# Patient Record
Sex: FEMALE | Race: WHITE | Employment: FULL TIME | URBAN - METROPOLITAN AREA
[De-identification: names, ages, dates, MRNs, and addresses within clinical notes are randomized per-mention and may not be internally consistent; named-entity substitution may affect disease eponyms.]

---

## 2018-08-21 ENCOUNTER — APPOINTMENT (OUTPATIENT)
Dept: MRI IMAGING | Facility: HOSPITAL | Age: 28
DRG: 060 | End: 2018-08-21
Attending: EMERGENCY MEDICINE

## 2018-08-21 ENCOUNTER — HOSPITAL ENCOUNTER (INPATIENT)
Facility: HOSPITAL | Age: 28
LOS: 4 days | Discharge: HOME | DRG: 060 | End: 2018-08-25
Attending: EMERGENCY MEDICINE | Admitting: HOSPITALIST

## 2018-08-21 DIAGNOSIS — R42 VERTIGO: Primary | ICD-10-CM

## 2018-08-21 DIAGNOSIS — G35 MULTIPLE SCLEROSIS (HCC): ICD-10-CM

## 2018-08-21 LAB
ALBUMIN SERPL-MCNC: 4.1 G/DL (ref 3.5–4.8)
ALBUMIN/GLOB SERPL: 1.3 {RATIO} (ref 1–2)
ALP LIVER SERPL-CCNC: 68 U/L (ref 37–98)
ALT SERPL-CCNC: 22 U/L (ref 14–54)
ANION GAP SERPL CALC-SCNC: 7 MMOL/L (ref 0–18)
AST SERPL-CCNC: 17 U/L (ref 15–41)
BASOPHILS # BLD AUTO: 0.02 X10(3) UL (ref 0–0.1)
BASOPHILS NFR BLD AUTO: 0.3 %
BILIRUB SERPL-MCNC: 0.5 MG/DL (ref 0.1–2)
BILIRUB UR QL STRIP.AUTO: NEGATIVE
BUN BLD-MCNC: 10 MG/DL (ref 8–20)
BUN/CREAT SERPL: 12.3 (ref 10–20)
CALCIUM BLD-MCNC: 9.1 MG/DL (ref 8.3–10.3)
CHLORIDE SERPL-SCNC: 107 MMOL/L (ref 101–111)
CLARITY UR REFRACT.AUTO: CLEAR
CO2 SERPL-SCNC: 27 MMOL/L (ref 22–32)
CREAT BLD-MCNC: 0.81 MG/DL (ref 0.55–1.02)
EOSINOPHIL # BLD AUTO: 0.17 X10(3) UL (ref 0–0.3)
EOSINOPHIL NFR BLD AUTO: 2.2 %
ERYTHROCYTE [DISTWIDTH] IN BLOOD BY AUTOMATED COUNT: 12.4 % (ref 11.5–16)
GLOBULIN PLAS-MCNC: 3.1 G/DL (ref 2.5–4)
GLUCOSE BLD-MCNC: 90 MG/DL (ref 70–99)
GLUCOSE UR STRIP.AUTO-MCNC: NEGATIVE MG/DL
HCT VFR BLD AUTO: 42 % (ref 34–50)
HGB BLD-MCNC: 14.1 G/DL (ref 12–16)
IMMATURE GRANULOCYTE COUNT: 0.05 X10(3) UL (ref 0–1)
IMMATURE GRANULOCYTE RATIO %: 0.6 %
KETONES UR STRIP.AUTO-MCNC: NEGATIVE MG/DL
LEUKOCYTE ESTERASE UR QL STRIP.AUTO: NEGATIVE
LYMPHOCYTES # BLD AUTO: 2.18 X10(3) UL (ref 0.9–4)
LYMPHOCYTES NFR BLD AUTO: 27.9 %
M PROTEIN MFR SERPL ELPH: 7.2 G/DL (ref 6.1–8.3)
MCH RBC QN AUTO: 29.9 PG (ref 27–33.2)
MCHC RBC AUTO-ENTMCNC: 33.6 G/DL (ref 31–37)
MCV RBC AUTO: 89 FL (ref 81–100)
MONOCYTES # BLD AUTO: 0.52 X10(3) UL (ref 0.1–1)
MONOCYTES NFR BLD AUTO: 6.7 %
NEUTROPHIL ABS PRELIM: 4.86 X10 (3) UL (ref 1.3–6.7)
NEUTROPHILS # BLD AUTO: 4.86 X10(3) UL (ref 1.3–6.7)
NEUTROPHILS NFR BLD AUTO: 62.3 %
NITRITE UR QL STRIP.AUTO: NEGATIVE
OSMOLALITY SERPL CALC.SUM OF ELEC: 291 MOSM/KG (ref 275–295)
PH UR STRIP.AUTO: 7 [PH] (ref 4.5–8)
PLATELET # BLD AUTO: 266 10(3)UL (ref 150–450)
POCT URINE PREGNANCY: NEGATIVE
POTASSIUM SERPL-SCNC: 4.2 MMOL/L (ref 3.6–5.1)
PROT UR STRIP.AUTO-MCNC: NEGATIVE MG/DL
RBC # BLD AUTO: 4.72 X10(6)UL (ref 3.8–5.1)
RED CELL DISTRIBUTION WIDTH-SD: 40.4 FL (ref 35.1–46.3)
SODIUM SERPL-SCNC: 141 MMOL/L (ref 136–144)
SP GR UR STRIP.AUTO: <1.005 (ref 1–1.03)
UROBILINOGEN UR STRIP.AUTO-MCNC: <2 MG/DL
WBC # BLD AUTO: 7.8 X10(3) UL (ref 4–13)

## 2018-08-21 PROCEDURE — 70553 MRI BRAIN STEM W/O & W/DYE: CPT | Performed by: EMERGENCY MEDICINE

## 2018-08-21 PROCEDURE — 99223 1ST HOSP IP/OBS HIGH 75: CPT | Performed by: HOSPITALIST

## 2018-08-21 RX ORDER — MECLIZINE HYDROCHLORIDE 25 MG/1
25 TABLET ORAL ONCE
Status: COMPLETED | OUTPATIENT
Start: 2018-08-21 | End: 2018-08-21

## 2018-08-21 RX ORDER — SODIUM CHLORIDE 9 MG/ML
INJECTION, SOLUTION INTRAVENOUS CONTINUOUS
Status: DISCONTINUED | OUTPATIENT
Start: 2018-08-21 | End: 2018-08-25

## 2018-08-21 RX ORDER — METOCLOPRAMIDE HYDROCHLORIDE 5 MG/ML
10 INJECTION INTRAMUSCULAR; INTRAVENOUS EVERY 8 HOURS PRN
Status: DISCONTINUED | OUTPATIENT
Start: 2018-08-21 | End: 2018-08-25

## 2018-08-21 RX ORDER — DOCUSATE SODIUM 100 MG/1
100 CAPSULE, LIQUID FILLED ORAL 2 TIMES DAILY
Status: DISCONTINUED | OUTPATIENT
Start: 2018-08-21 | End: 2018-08-25

## 2018-08-21 RX ORDER — ONDANSETRON 2 MG/ML
4 INJECTION INTRAMUSCULAR; INTRAVENOUS EVERY 6 HOURS PRN
Status: DISCONTINUED | OUTPATIENT
Start: 2018-08-21 | End: 2018-08-25

## 2018-08-21 RX ORDER — ENOXAPARIN SODIUM 100 MG/ML
40 INJECTION SUBCUTANEOUS NIGHTLY
Status: DISCONTINUED | OUTPATIENT
Start: 2018-08-21 | End: 2018-08-23

## 2018-08-21 RX ORDER — ACETAMINOPHEN 325 MG/1
650 TABLET ORAL EVERY 6 HOURS PRN
Status: DISCONTINUED | OUTPATIENT
Start: 2018-08-21 | End: 2018-08-25

## 2018-08-21 NOTE — ED INITIAL ASSESSMENT (HPI)
Dizzy since yesterday with nausea,  Gait unsteady, unable to drive straight, unable to type.   Denies paraesthesia now,
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
## 2018-08-21 NOTE — ED PROVIDER NOTES
Patient Seen in: [facility] Emergency Department    History   Patient presents with:  Dizziness (neurologic)    Stated Complaint: feeling lightheaded since yesterday    HPI    Patient is a 54-year-old female who states for the past 2 years she has h clear to auscultation bilaterally. CARDIOVASCULAR: + S1-S2, regular rate and rhythm, no murmurs. BACK: No CVA tenderness, no midline bony tenderness. ABDOMEN: + Bowel sounds, soft, nontender, nondistended.   No rebound, no guarding, no hepatosplenomegaly PROTEIN STUDY   ANCA PANEL VASCULITIS W/REFLEX   AQUAPORIN-4 RECEPTOR ANTIBODY   PATH COMMENT CSF   POCT PREGNANCY, URINE   CYTOLOGY FLUIDS   RAINBOW DRAW BLUE   RAINBOW DRAW LAVENDER   RAINBOW DRAW LIGHT GREEN   RAINBOW DRAW GOLD   CSF CULTURE   FUNGUS CU

## 2018-08-21 NOTE — ED NOTES
No change in patient's assessment. Reports she continues to feel off. Speech is clear, denies any weakness.   Patient aware and agrezes with plan for MRI, apologized for delay, patient understanding

## 2018-08-21 NOTE — ED PROVIDER NOTES
Mri Brain (w+wo) (cpt=70553)    Result Date: 8/21/2018  PROCEDURE:  MRI BRAIN (W+WO) (CPT=70553)  COMPARISON:  None.   INDICATIONS:  feeling lightheaded since yesterday  TECHNIQUE:  MRI of the brain was performed with multi-planar T1, T2-weighted images wit Dr. Eddy Agent at the dictation time shown below.      Dictated by: Almetta Dakin, MD on 8/21/2018 at 17:53     Approved by: Almetta Dakin, MD              MRI shows multiple areas of abnormal signal within the white matter concerning for a demyelinating process

## 2018-08-22 ENCOUNTER — APPOINTMENT (OUTPATIENT)
Dept: MRI IMAGING | Facility: HOSPITAL | Age: 28
DRG: 060 | End: 2018-08-22
Attending: NURSE PRACTITIONER

## 2018-08-22 PROBLEM — G37.9 DEMYELINATING DISEASE OF CENTRAL NERVOUS SYSTEM (HCC): Status: ACTIVE | Noted: 2018-08-21

## 2018-08-22 LAB
CRP SERPL-MCNC: <0.29 MG/DL (ref ?–1)
HAV AB SERPL IA-ACNC: 427 PG/ML (ref 193–986)
RHEUMATOID FACT SERPL-ACNC: <10 IU/ML (ref ?–15)
SED RATE-ML: 7 MM/HR (ref 0–25)

## 2018-08-22 PROCEDURE — 72156 MRI NECK SPINE W/O & W/DYE: CPT | Performed by: NURSE PRACTITIONER

## 2018-08-22 PROCEDURE — 99223 1ST HOSP IP/OBS HIGH 75: CPT | Performed by: OTHER

## 2018-08-22 PROCEDURE — 72157 MRI CHEST SPINE W/O & W/DYE: CPT | Performed by: NURSE PRACTITIONER

## 2018-08-22 PROCEDURE — 99232 SBSQ HOSP IP/OBS MODERATE 35: CPT | Performed by: HOSPITALIST

## 2018-08-22 NOTE — PHYSICAL THERAPY NOTE
PHYSICAL THERAPY EVALUATION - INPATIENT     Room Number: 425/425-A  Evaluation Date: 8/22/2018  Type of Evaluation: Initial  Physician Order: PT Eval and Treat    Presenting Problem: vertigo  Reason for Therapy: Mobility Dysfunction and Discharge Tim 5/5    BALANCE  Static Sitting: Good  Dynamic Sitting: Good  Static Standing: Fair -  Dynamic Standing: Poor +    ADDITIONAL TESTS                                    NEUROLOGICAL FINDINGS                      ACTIVITY TOLERANCE  Room air  No shortness of b right shoulders while standing   4  6.  Standing unsupported one foot in front                                   0  7. Standing on one leg                                                                0    Pt scored 16/28 which shows at high risk for falls Good  Frequency (Obs): 3x/week  Number of Visits to Meet Established Goals: 3      CURRENT GOALS    Goal #1      Goal #2 Patient is able to demonstrate transfers Sit to/from Stand at assistance level: independent     Goal #3 Patient is able to ambulate 300

## 2018-08-22 NOTE — CONSULTS
[id number] [facility] Initial Consultation    Angela Faulkner Patient Status:  Inpatient    1990 MRN [id number]   [facility] 4NW-A Attending Brooke Saeed MD   Hosp Day # 1 PCP Bessy Damon MD     REASON FOR CONSULTATION: 10-point system was reviewed. Pertinent positives and negatives are noted in HPI. PAST MEDICAL HISTORY:  History reviewed. No pertinent past medical history. PAST SURGICAL HISTORY:  History reviewed. No pertinent surgical history.     FAMILY HISTOR palate midline, SCM intact; otherwise, 2-12 intact  Motor: 5/5 strength throughout, tone normal  DTR: 2+ symmetric throughout, toes downgoing bilaterally, no clonus  Sensory: intact to light touch, pinprick, vibration and proprioception  Coord: FNF and HKS

## 2018-08-22 NOTE — PROGRESS NOTES
[facility]OSPITAL[facility]  Progress Note     Lizzette Gamble Patient Status:  Inpatient    1990 MRN [id number]   [facility] 4NW-A Attending Kris Byrne MD   Hosp Day # 1 PCP Piper West MD     Chief Complaint: dizziness ataxia    S: Patie imaging, eval per neurology today, with Lp and MRI c and T spine ordered  4.  PT/OT to see  Mary Dubose MD

## 2018-08-22 NOTE — PROGRESS NOTES
[id number] [facility] Preliminary Note    Theresa Proctor Patient Status:  Inpatient    1990 MRN [id number]   [facility] 4NW-A Attending Yoli Harkins MD   Hosp Day # 1 PCP Isak Samuel MD     REASON FOR CONSULTATION:    Reno Mazariegos that she has been smoking.   She has never used smokeless tobacco.    ALLERGIES:  No Known Allergies    MEDICATIONS:  Prior to Admission medications :  Not on File      Current Facility-Administered Medications:  0.9%  NaCl infusion  Intravenous Continuous 1. Multiple areas of abnormal signal within the white matter, some of which demonstrate enhancement and restricted diffusion as detailed above.    Findings most likely represent a demyelinating process such as multiple sclerosis particularly given ED MD

## 2018-08-22 NOTE — PLAN OF CARE
NURSING ADMISSION NOTE      Patient admitted via Cart  Oriented to room. Safety precautions initiated. Bed in low position. Call light in reach. Pt A/O x 4. Rm air. Denies pain. Denies N/V/D, reports good appetite.  Last BM a few days ago, reports

## 2018-08-22 NOTE — PAYOR COMM NOTE
--------------  ADMISSION REVIEW     Payor: N/A  Subscriber #:  No Subscriber Number on File  Authorization Number: N/A    Admit date: 8/21/18  Admit time: 2056       Admitting Physician: Shelly Clements MD  Attending Physician:  Sully Lozada MD  Primary Ca 60   Temp 98.2 °F (36.8 °C) (Oral)   Resp 16   Ht 5' 9\" (1.753 m)   Wt 181 lb 4.8 oz (82.2 kg)   LMP 08/20/2018   SpO2 99%   BMI 26.77 kg/m²          Diagnostic Data:      Labs:  Recent Labs   Lab  08/21/18   1157   WBC  7.8   HGB  14.1   MCV  89.0   PLT

## 2018-08-22 NOTE — OCCUPATIONAL THERAPY NOTE
OCCUPATIONAL THERAPY EVALUATION - INPATIENT     Room Number: 425/425-A  Evaluation Date: 8/22/2018  Type of Evaluation: Initial  Presenting Problem: vertigo     Physician Order: IP Consult to Occupational Therapy  Reason for Therapy: ADL/IADL Dysfunction a line.\"     Patient self-stated goal is none identified      OBJECTIVE  Precautions:  Other (Comment) (impulsive )  Fall Risk: High fall risk    WEIGHT BEARING RESTRICTION  Weight Bearing Restriction: None                PAIN ASSESSMENT  Ratin  Location 44.27  CMS Modifier (G-Code): CJ    FUNCTIONAL TRANSFER ASSESSMENT  Supine to Sit : Supervision  Sit to Stand: Supervision    Skilled Therapy Provided: Pt was found in bed in a semi-supine position. Pt performed supine>sit EOB with supervision assistance. performance deficits impacting engagement in ADL/IADL MODERATE  3 - 5 performance deficits   Client Assessment/Performance Deficits MODERATE - Comorbidities and min to mod modifications of tasks    Clinical Decision Making MODERATE - Analysis of occupation

## 2018-08-22 NOTE — H&P
[facility]  History and Physical     Jerel Gamboa Patient Status:  Inpatient    1990 MRN [id number]   [facility] 4NW-A Attending Sukhjinder Garcia MD   Hosp Day # 0 PCP Olivia Mercer MD     Chief Complaint: Dizziness, ataxia lb 4.8 oz (82.2 kg)   LMP 08/20/2018   SpO2 99%   BMI 26.77 kg/m²   General: No acute distress. Alert and oriented x 3. HEENT: Normocephalic atraumatic. Moist mucous membranes. EOM-I. PERRLA. Anicteric. Neck: No lymphadenopathy. No JVD.  No carotid bruits

## 2018-08-23 ENCOUNTER — APPOINTMENT (OUTPATIENT)
Dept: GENERAL RADIOLOGY | Facility: HOSPITAL | Age: 28
DRG: 060 | End: 2018-08-23
Attending: NURSE PRACTITIONER

## 2018-08-23 LAB
BASOPHIL CSF: 0 %
BASOPHIL CSF: 0 %
COLOR CSF: COLORLESS
COLOR CSF: COLORLESS
COUNT PERFORMED ON TUBE: 1
COUNT PERFORMED ON TUBE: 4
EOSINOPHILS CSF: 0 %
EOSINOPHILS CSF: 0 %
GLUCOSE CSF-MCNC: 76 MG/DL (ref 40–70)
INR BLD: 1.12 (ref 0.9–1.1)
LYMPHOCYTES CSF: 19 %
LYMPHOCYTES CSF: 41 %
MONOMACROPHAGES CSF: 2 %
MONOMACROPHAGES CSF: 4 %
NEUTROPHILS CSF: 55 %
NEUTROPHILS CSF: 79 %
PROT PATTERN CSF ELPH-IMP: 61.7 MG/DL (ref 15–45)
PSA SERPL DL<=0.01 NG/ML-MCNC: 14.9 SECONDS (ref 12.4–14.7)
RBC CSF: 6000 /MM3
RBC CSF: <3000 /MM3
TOTAL CELLS COUNTED: 100
TOTAL CELLS COUNTED: 100
TOTAL VOLUME CSF: 8 ML
TOTAL VOLUME CSF: 8 ML
WBC # FLD MANUAL: 19 /MM3 (ref 0–5)
WBC # FLD MANUAL: 49 /MM3 (ref 0–5)

## 2018-08-23 PROCEDURE — B01BZZZ FLUOROSCOPY OF SPINAL CORD: ICD-10-PCS | Performed by: RADIOLOGY

## 2018-08-23 PROCEDURE — 009U3ZX DRAINAGE OF SPINAL CANAL, PERCUTANEOUS APPROACH, DIAGNOSTIC: ICD-10-PCS | Performed by: RADIOLOGY

## 2018-08-23 PROCEDURE — 99233 SBSQ HOSP IP/OBS HIGH 50: CPT | Performed by: OTHER

## 2018-08-23 PROCEDURE — 77003 FLUOROGUIDE FOR SPINE INJECT: CPT | Performed by: NURSE PRACTITIONER

## 2018-08-23 PROCEDURE — 99232 SBSQ HOSP IP/OBS MODERATE 35: CPT | Performed by: HOSPITALIST

## 2018-08-23 PROCEDURE — 62270 DX LMBR SPI PNXR: CPT | Performed by: NURSE PRACTITIONER

## 2018-08-23 NOTE — PROCEDURES
PROCEDURE: XR LUMBAR PUNCTURE  (CPT=62270,24761)    COMPARISON: [facility] , MRI BRAIN (W+WO) (CPT=70553), 8/21/2018, 16:57. [facility] , MRI CERVICAL+THORACIC SPINE (ALL W+WO) (CPT=72156/95239), 8/22/2018, 21:00.     INDICATIONS: MS    TECHNIQUE: The risks, benef

## 2018-08-23 NOTE — PROGRESS NOTES
No complaints overnight. Had MRI of cervical/thoracic spine done tonight. Lovenox held tonight, will have LP tomorrow. Vitals stable. IVF infusing. Resting comfortably, all needs met.

## 2018-08-23 NOTE — PROGRESS NOTES
[id number] [facility] Progress Note    Theresa Proctor Patient Status:  Inpatient    1990 MRN [id number]   [facility] 4NW-A Attending Yoli Harkins MD   Hosp Day # 2 PCP Isak Samuel MD     Subjective:  Theresa Proctor is a(n Results  Component Value Date   INR 1.12 08/23/2018   ESRML 7 08/22/2018   CRP <0.29 08/22/2018   B12 427 08/22/2018       BMP:   No results found for: GLUCOSE  Lab Results  Component Value Date   K 4.2 08/21/2018     Lab Results  Component Value Date   BU Lumbar Puncture  (cpt=62270,35817)    Result Date: 8/23/2018  CONCLUSION:  Uneventful lumbar puncture. Dictated by: Daxa Cruz MD on 8/23/2018 at 12:54     Approved by: Daxa Cruz MD                Assessment:   This is a 33 y woman who presen

## 2018-08-23 NOTE — PROGRESS NOTES
[facility]  Progress Note     Theresa Proctor Patient Status:  Inpatient    1990 MRN [id number]   [facility] 4NW-A Attending Yoli Harkins MD   Hosp Day # 2 PCP Isak Samuel MD     Chief Complaint: blurry vision, gait  disturb improvement yet  3. Further imaging, eval per neurology today, with Lp and MRI c and T spine ordered  4.  PT/OT to see    Plan of care: finish 5 days of steoids    Quality:  · DVT Prophylaxis: scd  · CODE status: full  · Dowell: no  · Central line: no    Est

## 2018-08-24 LAB — NON GYNE INTERPRETATION: NEGATIVE

## 2018-08-24 PROCEDURE — 99232 SBSQ HOSP IP/OBS MODERATE 35: CPT | Performed by: OTHER

## 2018-08-24 PROCEDURE — 99232 SBSQ HOSP IP/OBS MODERATE 35: CPT | Performed by: HOSPITALIST

## 2018-08-24 NOTE — PAYOR COMM NOTE
--------------  CONTINUED STAY REVIEW      8/24           Subjective:  Theresa Proctor is a(n) 33year old female with no significant past medical history,   who presented for evaluation of multiple neurologic complaints, with dizziness, balance issues, vi

## 2018-08-24 NOTE — PROGRESS NOTES
[id number] [facility] Progress Note    Geneva Ramirez Patient Status:  Inpatient    1990 MRN [id number]   [facility] 4NW-A Attending Arielle Valerio MD   Hosp Day # 3 PCP Bonnie Moss MD         Subjective:  Geneva Ramirez is List:     Vertigo     Demyelinating disease of central nervous system [facility])    Assessment/Plan:    S/p LP on 8/23    This is a 33 y woman who presented with new onset dizziness, blurred vision and gait imbalance she reports hand paresthesias in past - MRI oral taper and follow up with neuro-opthalmology and neurology as outpatient      Roxi Chavez D.O.   Neurology

## 2018-08-24 NOTE — PROGRESS NOTES
[facility]  Progress Note     Jacquie Thompson Patient Status:  Inpatient    1990 MRN [id number]   [facility] 4NW-A Attending Julisa Tomas MD   Hosp Day # 3 PCP Ciro David MD     Chief Complaint: difficulties walking, blurre  reviewed  2. IV steroids x 4 days, to complete treatment in am  3. Further imaging, eval per neurology today, with Lp and MRI c and T spine done   4. PT/OT to follow  5.  D/c planning in am     Plan of care: finish 5 days of steoids     Quality:  · DVT Pro

## 2018-08-24 NOTE — PHYSICAL THERAPY NOTE
PHYSICAL THERAPY TREATMENT NOTE - INPATIENT    Room Number: 425/425-A     Session: 1   Number of Visits to Meet Established Goals: 3    Presenting Problem: vertigo  Reason for Therapy: Mobility Dysfunction and Discharge Planning     History related to cur does the patient currently need. ..   -   Moving to and from a bed to a chair (including a wheelchair)?: None   -   Need to walk in hospital room?: None   -   Climbing 3-5 steps with a railing?: None       AM-PAC Score:  Raw Score: 24   PT Approx Degree of mobility. The rehab aide will perform treatment activities prescribed by this physical therapist. The rehab aide will communicate with overseeing PT regarding any change in functional mobility. RN aware.        DISCHARGE RECOMMENDATIONS  PT Discharge Mitchel

## 2018-08-24 NOTE — PLAN OF CARE
Impaired Functional Mobility    • Achieve highest/safest level of mobility/gait Progressing        NEUROLOGICAL - ADULT    • Achieves stable or improved neurological status Progressing        SAFETY ADULT - FALL    • Free from fall injury Progressing

## 2018-08-25 VITALS
WEIGHT: 181.31 LBS | RESPIRATION RATE: 18 BRPM | HEIGHT: 69 IN | OXYGEN SATURATION: 98 % | DIASTOLIC BLOOD PRESSURE: 82 MMHG | BODY MASS INDEX: 26.85 KG/M2 | SYSTOLIC BLOOD PRESSURE: 115 MMHG | TEMPERATURE: 98 F | HEART RATE: 55 BPM

## 2018-08-25 LAB
ALBUMIN INDEX: 10.5 RATIO
ALBUMIN, CSF: 43 MG/DL
ALBUMIN, SERUM/PLASMA, NEPH: 4100 MG/DL
CSF IGG SYNTHESIS RATE: 8.6 MG/D
CSF IGG/ALBUMIN RATIO: 0.12 RATIO
IGG INDEX: 0.75 RATIO
IMMUNOGLOBULIN G CSF: 5.2 MG/DL
IMMUNOGLOBULIN G: 658 MG/DL

## 2018-08-25 PROCEDURE — 99238 HOSP IP/OBS DSCHRG MGMT 30/<: CPT | Performed by: HOSPITALIST

## 2018-08-25 PROCEDURE — 99232 SBSQ HOSP IP/OBS MODERATE 35: CPT | Performed by: NURSE PRACTITIONER

## 2018-08-25 RX ORDER — FAMOTIDINE 20 MG/1
20 TABLET ORAL 2 TIMES DAILY
Qty: 30 TABLET | Refills: 0 | Status: SHIPPED | OUTPATIENT
Start: 2018-08-25

## 2018-08-25 RX ORDER — PREDNISONE 20 MG/1
TABLET ORAL
Qty: 30 TABLET | Refills: 0 | Status: SHIPPED | OUTPATIENT
Start: 2018-08-25

## 2018-08-25 NOTE — PLAN OF CARE
Pt is alert and oriented x 4. Vitals stable. No C/o pain or discomfort. Pt denies any numbness or tingling. Still with some unsteady gait . No change in blurred vision. Ambulating with some unsteady gait. Ok to discharge per Neurology.  Will continue to mo

## 2018-08-25 NOTE — PROGRESS NOTES
[id number] [facility] Progress Note    Jenise Viera Patient Status:  Inpatient    1990 MRN [id number]   [facility] 4NW-A Attending Elijah Alva MD   [address] Day # 4 PCP Nacho Harry MD         Subjective:  Jenise Viera is is a 33 y woman who presented with new onset dizziness, blurred vision and gait imbalance she reports hand paresthesias in past - MRI brain showed lesions consistent with demyelinating disease         Diagnostics/Imaging     MRI brain: restricted diffusion

## 2018-08-25 NOTE — DISCHARGE SUMMARY
[facility]ITAL[facility]  DISCHARGE SUMMARY     Beny Clements Patient Status:  Inpatient    1990 MRN [id number]   [facility] 4NW-A Attending Erin Hunt MD   [facility] Day # 4 PCP Michael Ness MD     Date of Admission: 2018  Date of Dis in the morning for 5 days, then 2 tabs in morning for 5 days then 1 tab in morning for 5 days then dc   Quantity:  30 tablet  Refills:  0           Where to Get Your Medications      Please  your prescriptions at the location directed by your doctor

## 2018-08-25 NOTE — PROGRESS NOTES
Alert,oriented,in good sprits. No complaint of pain. Continues to have blurry vision. Possible D/C today after Solumedrol.

## 2018-08-25 NOTE — PLAN OF CARE
NURSING DISCHARGE NOTE    Discharged Home via Ambulatory. Accompanied by RN and mother. Belongings Returned to patient from safe.

## 2018-08-25 NOTE — CM/SW NOTE
tete consult order for follow up with [facility].  tete left VM for geri zafar of [facility] to call pt at home upon DC to follow up

## 2018-08-26 LAB
ANA SCREEN: NEGATIVE
CSF BAND OLIGOCLONAL: POSITIVE
CSF OLIGOCLONAL BANDS NUMBER: 11 BANDS

## 2018-08-27 LAB
ALBUMIN SERPL-MCNC: 4.48 G/DL (ref 3.5–4.8)
ALBUMIN/GLOB SERPL: 2.01 {RATIO}
ALPHA1 GLOB SERPL ELPH-MCNC: 0.16 G/DL (ref 0.1–0.3)
ALPHA2 GLOB SERPL ELPH-MCNC: 0.67 G/DL (ref 0.6–1)
B-GLOBULIN SERPL ELPH-MCNC: 0.7 G/DL (ref 0.7–1.2)
GAMMA GLOB SERPL ELPH-MCNC: 0.7 G/DL (ref 0.6–1.6)
KAPPA FREE LIGHT CHAIN: 0.91 MG/DL (ref 0.33–1.94)
KAPPA/LAMBDA FLC RATIO: 0.92 (ref 0.26–1.65)
LAMBDA FREE LIGHT CHAIN: 0.98 MG/DL (ref 0.57–2.63)
MAI PROTEIN SERPL-MCNC: 6.7 G/DL (ref 6.1–8.3)

## 2018-08-28 LAB
MYELOPEROX ANTIBODIES, IGG: 0 AU/ML
SERINE PROTEASE3, IGG: 1 AU/ML

## 2018-08-30 LAB — AQUAPORIN-4 RECEPTOR ANTIBODY: 0 U/ML

## 2018-09-19 ENCOUNTER — TELEPHONE (OUTPATIENT)
Dept: NEUROLOGY | Facility: CLINIC | Age: 28
End: 2018-09-19

## 2018-09-19 NOTE — TELEPHONE ENCOUNTER
Left detailed message per note below   Advised to call back with questions or concerns              Notes recorded by Morenita Hale MD on 9/14/2018 at 2:51 AM CDT  Results noted, all labs normal; will discuss at next visit

## 2019-12-30 ENCOUNTER — HOSPITAL ENCOUNTER (OUTPATIENT)
Dept: MRI IMAGING | Age: 29
Discharge: HOME | End: 2019-12-30
Attending: Other
Payer: COMMERCIAL

## 2019-12-30 DIAGNOSIS — G35 MS (MULTIPLE SCLEROSIS) (HCC): ICD-10-CM

## 2019-12-30 PROCEDURE — 72141 MRI NECK SPINE W/O DYE: CPT | Performed by: OTHER

## 2019-12-30 PROCEDURE — 70551 MRI BRAIN STEM W/O DYE: CPT | Performed by: OTHER
